# Patient Record
Sex: FEMALE | Race: WHITE
[De-identification: names, ages, dates, MRNs, and addresses within clinical notes are randomized per-mention and may not be internally consistent; named-entity substitution may affect disease eponyms.]

---

## 2018-12-04 ENCOUNTER — HOSPITAL ENCOUNTER (EMERGENCY)
Dept: HOSPITAL 97 - ER | Age: 83
Discharge: HOME | End: 2018-12-04
Payer: COMMERCIAL

## 2018-12-04 DIAGNOSIS — R10.11: Primary | ICD-10-CM

## 2018-12-04 DIAGNOSIS — I10: ICD-10-CM

## 2018-12-04 LAB
ALBUMIN SERPL BCP-MCNC: 4.1 G/DL (ref 3.4–5)
ALP SERPL-CCNC: 58 U/L (ref 45–117)
ALT SERPL W P-5'-P-CCNC: 23 U/L (ref 12–78)
AST SERPL W P-5'-P-CCNC: 15 U/L (ref 15–37)
BUN BLD-MCNC: 32 MG/DL (ref 7–18)
GLUCOSE SERPLBLD-MCNC: 89 MG/DL (ref 74–106)
HCT VFR BLD CALC: 36.4 % (ref 36–45)
INR BLD: 1.05
LIPASE SERPL-CCNC: 146 U/L (ref 73–393)
LYMPHOCYTES # SPEC AUTO: 2.3 K/UL (ref 0.7–4.9)
MCH RBC QN AUTO: 30.6 PG (ref 27–35)
MCV RBC: 91.2 FL (ref 80–100)
NT-PROBNP SERPL-MCNC: 823 PG/ML (ref ?–450)
PMV BLD: 8.6 FL (ref 7.6–11.3)
POTASSIUM SERPL-SCNC: 4.4 MMOL/L (ref 3.5–5.1)
RBC # BLD: 3.99 M/UL (ref 3.86–4.86)
TROPONIN (EMERG DEPT USE ONLY): < 0.02 NG/ML (ref 0–0.04)
UA COMPLETE W REFLEX CULTURE PNL UR: (no result)

## 2018-12-04 PROCEDURE — 81015 MICROSCOPIC EXAM OF URINE: CPT

## 2018-12-04 PROCEDURE — 83690 ASSAY OF LIPASE: CPT

## 2018-12-04 PROCEDURE — 93005 ELECTROCARDIOGRAM TRACING: CPT

## 2018-12-04 PROCEDURE — 85025 COMPLETE CBC W/AUTO DIFF WBC: CPT

## 2018-12-04 PROCEDURE — 36415 COLL VENOUS BLD VENIPUNCTURE: CPT

## 2018-12-04 PROCEDURE — 76705 ECHO EXAM OF ABDOMEN: CPT

## 2018-12-04 PROCEDURE — 81003 URINALYSIS AUTO W/O SCOPE: CPT

## 2018-12-04 PROCEDURE — 80076 HEPATIC FUNCTION PANEL: CPT

## 2018-12-04 PROCEDURE — 87086 URINE CULTURE/COLONY COUNT: CPT

## 2018-12-04 PROCEDURE — 87088 URINE BACTERIA CULTURE: CPT

## 2018-12-04 PROCEDURE — 71260 CT THORAX DX C+: CPT

## 2018-12-04 PROCEDURE — 84484 ASSAY OF TROPONIN QUANT: CPT

## 2018-12-04 PROCEDURE — 74177 CT ABD & PELVIS W/CONTRAST: CPT

## 2018-12-04 PROCEDURE — 70450 CT HEAD/BRAIN W/O DYE: CPT

## 2018-12-04 PROCEDURE — 71046 X-RAY EXAM CHEST 2 VIEWS: CPT

## 2018-12-04 PROCEDURE — 99285 EMERGENCY DEPT VISIT HI MDM: CPT

## 2018-12-04 PROCEDURE — 85610 PROTHROMBIN TIME: CPT

## 2018-12-04 PROCEDURE — 83880 ASSAY OF NATRIURETIC PEPTIDE: CPT

## 2018-12-04 PROCEDURE — 80048 BASIC METABOLIC PNL TOTAL CA: CPT

## 2018-12-04 PROCEDURE — 96374 THER/PROPH/DIAG INJ IV PUSH: CPT

## 2018-12-04 NOTE — XMS REPORT
Patient Summary Document

 Created on:2018



Patient:KHALIF TURNER

Sex:Female

:1935

External Reference #:[id number]





Demographics







 Address  [address] [place] [address]

 

 Home Phone  [phone]

 

 Email Address  NONE

 

 Preferred Language  Unknown

 

 Marital Status  Unknown

 

 Worship Affiliation  Unknown

 

 Race  Unknown

 

 Additional Race(s)  Unavailable

 

 Ethnic Group  Unknown









Author







 Organization  [facility]

 

 Address  [address] [place] [address] [place] [address]

 

 Phone  [phone]









Care Team Providers







 Name  Role  Phone

 

 ERON DIOP  Unavailable  Unavailable









Problems

This patient has no known problems.



Allergies, Adverse Reactions, Alerts

This patient has no known allergies or adverse reactions.



Medications

This patient has no known medications.



Results







 Test Description  Test Time  Test Comments  Text Results  Atomic Results  
Result Comments









 CT          [facility]   [address],  



 [place] [address]      Patient Name: KHALIF TURNER   MR #: 
[id number]    :  



 WO      1935 Age/Sex: 81/F  Acct #: [id number] Req #: [id number]  Adm 
Physician:  



         Ordered by: ERON DIOP MD  Report #: [id number]   Location: ER  Room/
Bed:  



       _________________________________________________________________________
_________  



       _________________    Procedure: [id number] CT/CT BRAIN WO  Exam Date:  



                     Exam Time:        REPORT STATUS: Signed    Examination: CT 
BRAIN  



       WITHOUT CONTRAST      History:Headache.   Comparison studies:None      
Technique:  



        Axial images were obtained from the skull base to the vertex.   Coronal 
and  



       sagittal images reconstructed from the axial data.   Intravenous contrast
: None  



         Findings:      Scalp: No abnormalities.   Bones: No fractures, blastic 
or lytic  



       lesions.      Brain sulci: Appropriate for age.   Ventricles: Normal in 
size and  



       configuration. No hydrocephalus.      Extra-axial space:   No 
abnormalities.  



       Parenchyma:    There are patchy and confluent areas of hypoattenuation 
in the  



       periventricular   and subcortical white matter, nonspecific.    No masses
,  



       hemorrhage, or acute or chronic cortical based vascular insults.  



       Sellar/suprasellar region: No abnormalities.   Craniocervical junction: 
Patent  



       foramen magnum. No Chiari one malformation.      Incidental findings:  



       Atherosclerotic calcification of the cavernous and supraclinoid internal
   carotid  



       arteries.      Impression:       1.  No acute intracranial abnormality. 
  2.  



       Moderate chronic microvascular ischemic change.      Signed by: Dr. Kelly Shahid M.D. on 10/2/2017 3:07 PM        Dictated By: KELLY SHAHID MD  



        Electronically Signed By: KELLY MURPHY MD on 10/02/17 1507  



       Transcribed By: RADHA on 10/02/17 1505       COPY TO:   ERON DIOP MD

## 2018-12-04 NOTE — EDPHYS
Physician Documentation                                                                           

 [facility]                                                                

Name: Mikaela Saleh                                                                               

Age: 82 yrs                                                                                       

Sex: Female                                                                                       

: 1935                                                                                   

MRN: [id number]                                                                                   

Arrival Date: 2018                                                                          

Time: 18:41                                                                                       

Account#: [id number]                                                                            

Bed 23                                                                                            

Private MD: Wilfredo Tello                                                                           

ED Physician Valeriano Slade                                                                      

HPI:                                                                                              

                                                                                             

21:47 This 82 yrs old  Female presents to ER via Wheelchair with complaints of Chest wa  

      Pain.                                                                                       

21:47 The patient or guardian reports chest pain that is located primarily in the R upper     wa  

      abdomen below the ribs. Onset: 3 day(s) ago. The pain does not radiate. Associated          

      signs and symptoms: The patient has no apparent associated signs or symptoms. The chest     

      pain is described as sharp, worse with inspiration. Duration: The patient or guardian       

      reports a single episode, that is still ongoing. Modifying factors: The symptoms are        

      alleviated by nothing. the symptoms are aggravated by breathing, movement, palpation of     

      area. Severity of pain: At its worst the pain was moderate in the emergency department      

      the pain is unchanged. The patient has not experienced similar symptoms in the past.        

                                                                                                  

Historical:                                                                                       

- Allergies:                                                                                      

18:57 No Known Allergies;                                                                     sv  

- PMHx:                                                                                           

18:57 High Cholesterol; Hypertension; Hypothyroidism;                                         sv  

- PSHx:                                                                                           

18:57 right wrist;                                                                            sv  

                                                                                                  

- Immunization history:: Flu vaccine is up to date.                                               

- Social history:: Smoking status: Patient/guardian denies using tobacco.                         

- Ebola Screening: : No symptoms or risks identified at this time.                                

- Family history:: not pertinent.                                                                 

- Hospitalizations: : No recent hospitalization is reported.                                      

                                                                                                  

                                                                                                  

ROS:                                                                                              

21:49 Constitutional: Negative for fever, chills, and weight loss, Eyes: Negative for injury, wa  

      pain, redness, and discharge, ENT: Negative for injury, pain, and discharge, Neck:          

      Negative for injury, pain, and swelling, Back: Negative for injury and pain, :            

      Negative for injury, bleeding, discharge, and swelling, MS/Extremity: Negative for          

      injury and deformity, Skin: Negative for injury, rash, and discoloration, Neuro:            

      Negative for headache, weakness, numbness, tingling, and seizure, Psych: Negative for       

      depression, anxiety, suicide ideation, homicidal ideation, and hallucinations.              

21:49 Cardiovascular: Positive for chest pain, with movement, of the R upper abdomen and          

      chest and epigastric area, Negative for edema, orthopnea, palpitations, paroxysmal          

      nocturnal dyspnea.                                                                          

21:49 Abdomen/GI: Positive for abdominal pain, of the epigastric area and right upper             

      quadrant, Negative for nausea, vomiting, diarrhea.                                          

21:49 All other systems are negative.                                                             

                                                                                                  

Exam:                                                                                             

21:50 Constitutional:  This is a well developed, well nourished patient who is awake, alert,  wa  

      and in no acute distress. Head/Face:  Normocephalic, atraumatic. Eyes:  Pupils equal        

      round and reactive to light, extra-ocular motions intact.  Lids and lashes normal.          

      Conjunctiva and sclera are non-icteric and not injected.  Cornea within normal limits.      

      Periorbital areas with no swelling, redness, or edema. ENT:  Nares patent. No nasal         

      discharge, no septal abnormalities noted.  Tympanic membranes are normal and external       

      auditory canals are clear.  Oropharynx with no redness, swelling, or masses, exudates,      

      or evidence of obstruction, uvula midline.  Mucous membranes moist. Neck:  Trachea          

      midline, no thyromegaly or masses palpated, and no cervical lymphadenopathy.  Supple,       

      full range of motion without nuchal rigidity, or vertebral point tenderness.  No            

      Meningismus. Chest/axilla:  Normal chest wall appearance and motion.  Nontender with no     

      deformity.  No lesions are appreciated. Cardiovascular:  Regular rate and rhythm with a     

      normal S1 and S2.  No gallops, murmurs, or rubs.  Normal PMI, no JVD.  No pulse             

      deficits. Respiratory:  Lungs have equal breath sounds bilaterally, clear to                

      auscultation and percussion.  No rales, rhonchi or wheezes noted.  No increased work of     

      breathing, no retractions or nasal flaring. Back:  No spinal tenderness.  No                

      costovertebral tenderness.  Full range of motion. Skin:  Warm, dry with normal turgor.      

      Normal color with no rashes, no lesions, and no evidence of cellulitis. MS/ Extremity:      

      Pulses equal, no cyanosis.  Neurovascular intact.  Full, normal range of motion. Neuro:     

       Awake and alert, GCS 15, oriented to person, place, time, and situation.  Cranial          

      nerves II-XII grossly intact.  Motor strength 5/5 in all extremities.  Sensory grossly      

      intact.  Cerebellar exam normal.  Normal gait. Psych:  Awake, alert, with orientation       

      to person, place and time.  Behavior, mood, and affect are within normal limits.            

21:50 Abdomen/GI: Inspection: Bowel sounds: Palpation: moderate abdominal tenderness, in the      

      epigastric area and right upper quadrant.                                                   

                                                                                                  

Vital Signs:                                                                                      

18:57  / 57; Pulse 57; Resp 20; Temp 97; Pulse Ox 100% ; Weight 70.76 kg; Height 5 ft.  sv  

      4 in. (162.56 cm); Pain 0/10;                                                               

20:07  / 91; Pulse 76; Resp 20; Pulse Ox 99% on R/A;                                    aj  

20:41  / 54; Pulse 57; Resp 18; Pulse Ox 99% on R/A;                                    mt  

22:53  / 71; Pulse 53; Resp 16; Pulse Ox 97% ;                                          rv  

18:57 Body Mass Index 26.78 (70.76 kg, 162.56 cm)                                             sv  

                                                                                                  

MDM:                                                                                              

19:10 Patient medically screened.                                                             wa  

21:50 Differential diagnosis: tender RUQ. pain on same side on inspiration. will eval to r/o  wa  

      differentials above and below the diaphragm on the right.                                   

22:35 Data reviewed: vital signs, nurses notes, lab test result(s), EKG, radiologic studies.  wa  

      Test interpretation: by ED physician or midlevel provider: EKG: HR 51. sinus bk.         

      short ID. labs noted wnl except decreased GFR. RUQ US, CXR, CT chest/abd/pelvis: no         

      acute abnml.                                                                                

22:37 Response to treatment: the patient's symptoms have markedly improved after treatment,   wa  

      pain resolved with a dose of toradol. will d/c with close f/u.                              

                                                                                                  

                                                                                             

19:25 Order name: Basic Metabolic Panel; Complete Time: 20:46                                                                                                                              

19:25 Order name: CBC with Diff; Complete Time: 20:46                                                                                                                                      

19:25 Order name: LFT's; Complete Time: 20:46                                                                                                                                              

19:25 Order name: NT PRO-BNP; Complete Time: 20:47                                                                                                                                         

19:25 Order name: PT-INR; Complete Time: 20:47                                                                                                                                             

19:25 Order name: Troponin (emerg Dept Use Only); Complete Time: 20:47                                                                                                                     

19:25 Order name: Urine Microscopic Only; Complete Time: 20:47                                                                                                                             

19:25 Order name: Lipase; Complete Time: 20:47                                                                                                                                             

19:25 Order name: Chest Pa And Lat (2 Views) XRAY; Complete Time: 20:47                       wa  

                                                                                             

19:26 Order name: Head Brain Wo Cont CT; Complete Time: 20:47                                 wa  

                                                                                             

20:00 Order name: Urine Dipstick--Ancillary (enter results); Complete Time: 20:46             ms  

                                                                                             

20:39 Order name: Urine Culture                                                               EDMS

                                                                                             

20:48 Order name: CT Chest, Abdomen, Pelvis - W/Contrast; Complete Time: 22:14                wa  

                                                                                             

21:00 Order name: US Abdomen Limited; Complete Time: 22:14                                    wa  

                                                                                             

19:25 Order name: EKG; Complete Time: 19:26                                                   wa  

                                                                                             

19:25 Order name: Cardiac monitoring; Complete Time: 19:41                                    wa  

                                                                                             

19:25 Order name: EKG - Nurse/Tech; Complete Time: 19:41                                      wa  

                                                                                             

19:25 Order name: IV Saline Lock; Complete Time: 20:06                                        wa  

                                                                                             

19:25 Order name: Labs collected and sent; Complete Time: 20:07                               wa  

                                                                                             

19:25 Order name: O2 Per Protocol; Complete Time: 19:41                                       wa  

                                                                                             

19:25 Order name: O2 Sat Monitoring; Complete Time: 19:41                                     wa  

                                                                                             

19:25 Order name: Urine Dipstick-Ancillary (obtain specimen); Complete Time: 19:53            wa  

                                                                                                  

Administered Medications:                                                                         

20:22 Drug: TORadol 15 mg Route: IVP; Site: right antecubital;                                aj  

22:37 Follow up: Response: Pain is unchanged, physician notified                              rv  

22:33 Drug: Tylenol 1000 mg Route: PO;                                                        rv  

22:37 Follow up: Response: Medication administered at discharge.                              rv  

                                                                                                  

                                                                                                  

Disposition:                                                                                      

18 22:39 Discharged to Home. Impression: acute right upper quadrant abdominal pain.         

- Condition is Stable.                                                                            

- Discharge Instructions: Abdominal Pain, Adult, Easy-to-Read.                                    

                                                                                                  

- Medication Reconciliation Form, Thank You Letter, Antibiotic Education, Prescription            

  Opioid Use form.                                                                                

- Follow up: Private Physician; When: 1 - 2 days; Reason: Recheck today's complaints.             

- Problem is new.                                                                                 

- Symptoms have improved.                                                                         

- Notes: take tylenol for pain as needed as discussed. return to ER for any worsening             

  concerns you may develop. see you doctor next available business day for further                

  evaluation                                                                                      

                                                                                                  

                                                                                                  

Signatures:                                                                                       

Dispatcher [employer]                           Evelyn Pan RN                    Sierra Almonte RN                       Valeriano Siddiqui MD MD wa Vicente, Ronaldo, RN                    RN   rv                                                   

                                                                                                  

Corrections: (The following items were deleted from the chart)                                    

22:54 22:39 2018 22:39 Discharged to Home. Impression: acute right upper quadrant       rv  

      abdominal pain. Condition is Stable. Forms are Medication Reconciliation Form, Thank        

      You Letter, Antibiotic Education, Prescription Opioid Use. Follow up: Private               

      Physician; When: 1 - 2 days; Reason: Recheck today's complaints. Problem is new.            

      Symptoms have improved. wa                                                                  

                                                                                                  

**************************************************************************************************

## 2018-12-04 NOTE — RAD REPORT
EXAM DESCRIPTION:  US - Abdomen Exam Limited - 12/4/2018 9:57 pm

 

CLINICAL HISTORY:  Abdominal pain.

 

COMPARISON:  2013

 

FINDINGS:  The previously described gallstone is not visualized on the current examination.

 

Gallbladder wall is not thickened.

 

The biliary tree is normal caliber.

 

IMPRESSION:  No abnormality visualized

## 2018-12-04 NOTE — RAD REPORT
EXAM DESCRIPTION:  CT - Chest Abdomen Pelvis W Cont - 12/4/2018 9:23 pm

 

CLINICAL HISTORY:   Chest and abdominal pain

 

COMPARISON:   CT abdomen 2016

 

TECHNIQUE:  Computed axial tomography of the chest, abdomen and pelvis was obtained. 100 cc Isovue-30
0 was administered intravenously. Oral contrast was not requested. This limits evaluation of bowel.

 

All CT scans are performed using dose optimization technique as appropriate and may include automated
 exposure control or mA/KV adjustment according to patient size.

 

FINDINGS:  A pleural effusion is not present. A pericardial effusion is not noted.

 

Lungs are generally clear.

 

No mediastinal or hilar lymphadenopathy seen

 

The liver, spleen, pancreas, adrenals and left kidney appear unremarkable. 31 millimeter right renal 
cyst

 

A left hip arthroplasty. Artifact from the prosthesis obscures some detail in the pelvis.

 

There is no evidence of diverticulitis. Atherosclerotic changes involve the arteries.

 

IMPRESSION:  No acute abnormality displayed.

## 2018-12-04 NOTE — ER
Nurse's Notes                                                                                     

 [facility]                                                                

Name: Mikaela Saleh                                                                               

Age: 82 yrs                                                                                       

Sex: Female                                                                                       

: 1935                                                                                   

MRN: [id number]                                                                                   

Arrival Date: 2018                                                                          

Time: 18:41                                                                                       

Account#: [id number]                                                                            

Bed 23                                                                                            

Private MD: Wilfredo Tello                                                                           

Diagnosis: acute right upper quadrant abdominal pain                                              

                                                                                                  

Presentation:                                                                                     

                                                                                             

18:56 Presenting complaint: Patient states: Dyspnea, right lower sided chest pain, epigastric sv  

      pain started . Transition of care: patient was not received from another setting      

      of care. Onset of symptoms was 2018. Care prior to arrival: None.               

18:56 Method Of Arrival: Wheelchair                                                           sv  

18:56 Acuity: MONROE 3                                                                           sv  

22:38 Risk Assessment: Do you want to hurt yourself or someone else? Patient reports no       rv  

      desire to harm self or others. Initial Sepsis Screen: Does the patient meet any 2           

      criteria? No. Patient's initial sepsis screen is negative. Does the patient have a          

      suspected source of infection? No. Patient's initial sepsis screen is negative.             

                                                                                                  

Triage Assessment:                                                                                

18:57 General: Appears in no apparent distress. uncomfortable, Behavior is calm, cooperative, sv  

      appropriate for age. Pain: Complains of pain in right lateral anterior chest and            

      epigastric area. Neuro: Level of Consciousness is awake, alert, obeys commands,             

      Oriented to person, place, time, situation, Moves all extremities. Full function Gait       

      is steady. Respiratory: Airway is patent Respiratory effort is unlabored, shallow,          

      Respiratory pattern is regular, symmetrical.                                                

                                                                                                  

Historical:                                                                                       

- Allergies:                                                                                      

18:57 No Known Allergies;                                                                     sv  

- PMHx:                                                                                           

18:57 High Cholesterol; Hypertension; Hypothyroidism;                                         sv  

- PSHx:                                                                                           

18:57 right wrist;                                                                            sv  

                                                                                                  

- Immunization history:: Flu vaccine is up to date.                                               

- Social history:: Smoking status: Patient/guardian denies using tobacco.                         

- Ebola Screening: : No symptoms or risks identified at this time.                                

- Family history:: not pertinent.                                                                 

- Hospitalizations: : No recent hospitalization is reported.                                      

                                                                                                  

                                                                                                  

Screenin:07 Abuse screen: Denies threats or abuse. Denies injuries from another. Nutritional        aj  

      screening: No deficits noted. Tuberculosis screening: No symptoms or risk factors           

      identified. Fall Risk None identified.                                                      

                                                                                                  

Assessment:                                                                                       

20:07 General: Appears in no apparent distress. comfortable, Behavior is calm, cooperative,   aj  

      appropriate for age. Pain: Complains of pain in chest Pain does not radiate. Pain began     

      gradually. Neuro: Level of Consciousness is awake, alert, obeys commands, Oriented to       

      person, place, time, situation, Appropriate for age. Cardiovascular: Capillary refill <     

      3 seconds in bilateral fingers. Cardiovascular: Reports chest pain. Respiratory: Airway     

      is patent Respiratory effort is even, unlabored, Respiratory pattern is regular,            

      symmetrical. Derm: Skin is intact, is healthy with good turgor, Skin is pink, warm \T\      

      dry. normal.                                                                                

                                                                                                  

Vital Signs:                                                                                      

18:57  / 57; Pulse 57; Resp 20; Temp 97; Pulse Ox 100% ; Weight 70.76 kg; Height 5 ft.  sv  

      4 in. (162.56 cm); Pain 0/10;                                                               

20:07  / 91; Pulse 76; Resp 20; Pulse Ox 99% on R/A;                                    aj  

20:41  / 54; Pulse 57; Resp 18; Pulse Ox 99% on R/A;                                    mt  

22:53  / 71; Pulse 53; Resp 16; Pulse Ox 97% ;                                          rv  

18:57 Body Mass Index 26.78 (70.76 kg, 162.56 cm)                                             sv  

                                                                                                  

ED Course:                                                                                        

18:41 Patient arrived in ED.                                                                  dl4 

18:41 Wilfredo Tello is Private Physician.                                                       dl4 

18:57 Triage completed.                                                                       sv  

18:57 Arm band placed on.                                                                     sv  

19:10 Valeriano Slade MD is Attending Physician.                                             wa  

19:11 Sierra Rachel, RN is Primary Nurse.                                                     aj  

19:31 Patient moved to CT.                                                                    nj  

20:05 Head Brain Wo Cont CT In Process Unspecified.                                           EDMS

20:07 Patient has correct armband on for positive identification. Cardiac monitor on. Pulse   aj  

      ox on. NIBP on.                                                                             

20:07 Inserted saline lock: 22 gauge in right antecubital area, using aseptic technique.      aj  

      Blood collected. Patient maintains SpO2 saturation greater than 95% on room air.            

20:13 Chest Pa And Lat (2 Views) XRAY In Process Unspecified.                                 EDMS

21:22 CT Chest, Abdomen, Pelvis - W/Contrast In Process Unspecified.                          EDMS

21:51 US Abdomen Limited In Process Unspecified.                                              EDMS

22:37 Urine Culture Sent.                                                                     rv  

22:37 No provider procedures requiring assistance completed. IV discontinued, bleeding        rv  

      controlled, No redness/swelling at site. Pressure dressing applied.                         

                                                                                                  

Administered Medications:                                                                         

20:22 Drug: TORadol 15 mg Route: IVP; Site: right antecubital;                                aj  

22:37 Follow up: Response: Pain is unchanged, physician notified                              rv  

22:33 Drug: Tylenol 1000 mg Route: PO;                                                        rv  

22:37 Follow up: Response: Medication administered at discharge.                              rv  

                                                                                                  

                                                                                                  

Outcome:                                                                                          

22:37 Discharged to home ambulatory.                                                          rv  

22:37 Condition: good                                                                             

22:39 Discharge ordered by MD.                                                                wa  

22:53 Discharge instructions given to patient, family, Instructed on discharge instructions,  rv  

      follow up and referral plans. Demonstrated understanding of instructions, follow-up         

      care.                                                                                       

22:54 Patient left the ED.                                                                    rv  

                                                                                                  

Signatures:                                                                                       

Dispatcher [employer]                           Evelyn Pan RN RN sv Myers, Amanda, RN RN aj Jordan, Nathan nj Thompson, Moriah mt Appiah, William, MD MD wa Vicente, Ronaldo, RN RN rv Luna, David                                  dl4                                                  

                                                                                                  

**************************************************************************************************

## 2018-12-04 NOTE — RAD REPORT
EXAM DESCRIPTION:  Delilah Jarvis (2 Views)12/4/2018 8:13 pm

 

CLINICAL HISTORY:  Chest pain

 

COMPARISON:  None

 

FINDINGS:   The lungs appear clear of acute infiltrate. The heart is borderline enlarged

 

IMPRESSION:   No acute abnormalities displayed

## 2018-12-04 NOTE — RAD REPORT
EXAM DESCRIPTION:  CT - Head Brain Wo Cont - 12/4/2018 8:05 pm

 

CLINICAL HISTORY:  Disequilibrium/ataxia

 

COMPARISON:  September 2017

 

TECHNIQUE:  Computed axial tomography of the head was obtained. IV contrast was not requested.

 

All CT scans are performed using dose optimization technique as appropriate and may include automated
 exposure control or mA/KV adjustment according to patient size.

 

FINDINGS:  An intracranial  bleed is not seen .

 

The ventricles are normal in caliber.

 

No extra-axial fluid collection is noted. Mild to moderate low-density areas within periventricular, 
deep and subcortical white matter likely represent ischemic changes secondary to small vessel disease
.

 

Fluid within the sinuses/ mastoids is not seen.

 

IMPRESSION:  No acute intracranial abnormality is seen. If patient's symptoms persist  MRI of the bra
in would be recommended.